# Patient Record
Sex: FEMALE | Race: WHITE
[De-identification: names, ages, dates, MRNs, and addresses within clinical notes are randomized per-mention and may not be internally consistent; named-entity substitution may affect disease eponyms.]

---

## 2018-11-04 ENCOUNTER — HOSPITAL ENCOUNTER (EMERGENCY)
Dept: HOSPITAL 80 - FED | Age: 58
Discharge: STILL ADMITTED OR ADMINISTRATIVE | End: 2018-11-04
Payer: COMMERCIAL

## 2018-11-04 VITALS — DIASTOLIC BLOOD PRESSURE: 71 MMHG | SYSTOLIC BLOOD PRESSURE: 165 MMHG

## 2018-11-04 DIAGNOSIS — H33.001: ICD-10-CM

## 2018-11-04 DIAGNOSIS — H54.61: Primary | ICD-10-CM

## 2018-11-04 NOTE — EDPHY
H & P


Time Seen by Provider: 11/04/18 17:07


HPI/ROS: 





CHIEF COMPLAINT:  Right eye vision loss





HISTORY OF PRESENT ILLNESS:  Patient is a 57-year-old female with no 

significant past medical history who states that this morning she noticed a 

small arced area of blurry dark vision at the bottom of her visual field on the 

right eye.  The vision in her left eye is unchanged.  Throughout the days she 

has noticed that this area of blurry/dark vision has gradually worsened to 

being either black or gray and has increased in size and is now approximately 

half of her visual field of the right eye.  There was no trauma to the eye.  

She denies any pain.  She does wear contacts.  She takes no prescribed 

medications.  She does not smoke or use any drugs or alcohol.





ROS As detailed in HPI


Smoking Status: Never smoked


Physical Exam: 





General:  Alert and oriented.  Nontoxic appearing.  No acute distress


HEENT:  Pupils PERRLA. Normal funduscopic exam. No oral lesions.  


Cardiopulmonary:  Regular rate and rhythm.  No lower extremity edema


Skin:  Pink warm and dry.  No lesions.


Muscle skeletal:  Moving all 4 extremities.  Equal strength in upper 

extremities and lower extremities.  Ambulatory.





Constitutional: 


 Initial Vital Signs











Temperature (C)  36.5 C   11/04/18 16:59


 


Heart Rate  72   11/04/18 16:59


 


Respiratory Rate  16   11/04/18 16:59


 


Blood Pressure  146/72 H  11/04/18 16:59


 


O2 Sat (%)  96   11/04/18 16:59








 











O2 Delivery Mode               Room Air














Allergies/Adverse Reactions: 


 





No Known Allergies Allergy (Unverified 11/04/18 17:05)


 








Home Medications: 














 Medication  Instructions  Recorded


 


NK [No Known Home Meds]  11/04/18














Medical Decision Making


ED Course/Re-evaluation: 


57-year-old female here with right eye vision loss starting this morning.  

History and exam concerning for retinal detachment.  Patient was seen and 

evaluated by Anne in the emergency room and agrees with diagnosis of retinal 

detachment.  Patient was sent directly to Dr. Jerry's office was awaiting her 

for surgical intervention.  Patient agrees with this plan.


Differential Diagnosis: 





Retinal detachment, open globe, vitreous hemorrhage, macular degeneration





Departure





- Departure


Disposition: To OP Cath/Surgery


Clinical Impression: 


 Retinal detachment, right





Condition: Fair


Instructions:  Surgery for Retinal Detachment (DC)


Additional Instructions: 


Go directly to Dr. Jerry's office now.  He is waiting for you to proceed with 

surgery.


Referrals: 


Frankel,Zara, MD [Primary Care Provider] - As per Instructions


Laura Jerry MD [Medical Doctor] - As per Instructions

## 2018-11-05 NOTE — GCON
OPHTHALMOLOGY CONSULT REPORT





REASON FOR CONSULTATION:  I was called by PA, Luac Rivera, as the patient had sudden decrease i
n vision in the lower quadrant of her vision in the right eye.



HISTORY OF PRESENT ILLNESS:  This is a 57-year-old woman, who noticed decreased vision in the inferio
r field of her right eye starting this morning and worsened throughout the day.  She had flashing lig
hts and floaters.



PAST MEDICAL HISTORY:  No previous eye complaints.  No other medical history.



MEDICATIONS:  Multivitamin.



ALLERGIES:  None.



SOCIAL HISTORY:  Noncontributory.



FAMILY HISTORY:  No history of eye problems.



REVIEW OF SYSTEMS:  All negative, except for the loss of vision.



PHYSICAL EXAMINATION:  EYES:  Vision was 20/70 in the right eye and 20/20 in the left eye.  She did h
ave a mild APD in the right eye.  Light intensity was decreased significantly in the right eye.  Conf
rontational fields showed decreased vision inferiorly in the right, otherwise intact.  Eyelids were n
ormal.  Conjunctivae and sclerae were normal.  Corneas were normal.  Anterior chamber was deep.  Iris
 was normal.  Lens was normal.  She was dilated at 6 o'clock with tropicamide and phenylephrine.  Dil
ated fundus exam showed a superior retinal detachment.  Macula appeared to be on, although her vision
 was down.  Vessels were normal.  Disks were normal.



ASSESSMENT:  Retinal detachment superiorly in the right eye.  Vision was 20/70, so macula is likely p
ulling away some.



PLAN:  I spoke with Dr. Laura Jerry from the retina service and he has agreed to see her immediatel
y.





Job #:  [id number]

## 2018-12-14 ENCOUNTER — HOSPITAL ENCOUNTER (OUTPATIENT)
Dept: HOSPITAL 80 - FIMAGING | Age: 58
End: 2018-12-14
Attending: FAMILY MEDICINE
Payer: COMMERCIAL

## 2018-12-14 DIAGNOSIS — N88.8: Primary | ICD-10-CM
